# Patient Record
Sex: FEMALE | URBAN - METROPOLITAN AREA
[De-identification: names, ages, dates, MRNs, and addresses within clinical notes are randomized per-mention and may not be internally consistent; named-entity substitution may affect disease eponyms.]

---

## 2019-08-02 ENCOUNTER — APPOINTMENT (RX ONLY)
Dept: URBAN - METROPOLITAN AREA CLINIC 60 | Facility: CLINIC | Age: 29
Setting detail: DERMATOLOGY
End: 2019-08-02

## 2019-08-02 DIAGNOSIS — Z41.9 ENCOUNTER FOR PROCEDURE FOR PURPOSES OTHER THAN REMEDYING HEALTH STATE, UNSPECIFIED: ICD-10-CM

## 2019-08-02 PROCEDURE — ? ADDITIONAL NOTES

## 2019-08-02 PROCEDURE — ? JUVEDERM ULTRA PLUS XC INJECTION

## 2019-08-02 NOTE — PROCEDURE: JUVEDERM ULTRA PLUS XC INJECTION
Decollete Filler  Volume In Cc: 0
Additional Area 2 Location: perioral lines
Topical Anesthesia?: BLT cream (benzocaine 20%, lidocaine 6%, tetracaine 4%)
Procedural Text: The filler was administered to the treatment areas noted above.
Anesthesia Volume In Cc: 0.5
Lot #: [id number]
Additional Area 4 Location: lower verm border
Detail Level: Detailed
Map Statment: See Attach Map for Complete Details
Post-Care Instructions: Patient instructed to apply ice to reduce swelling.
Include Cannula Length?: 1 inch
Include Cannula Brand?: DermaSculpt
Additional Anesthesia Volume In Cc: 0.1
Additional Area 3 Location: upper verm border
Additional Area 1 Location: oral comms
Expiration Date (Month Year): 2020/09/27
Anesthesia Type: 1% lidocaine with epinephrine
Additional Area 5 Location: nose tip
Filler: Juvederm Ultra Plus XC
Include Cannula Information In Note?: Yes
Additional Anesthesia Type: 1% lidocaine with 1:100,000 epinephrine, 1% lidocaine without epinephrine and 8.4% sodium bicarbonate in a 2:2:1 Ratio
Consent: Written consent obtained. Risks include but not limited to bruising, beading, irregular texture, ulceration, infection, allergic reaction, scar formation, incomplete augmentation, temporary nature, procedural pain.
Include Cannula Size?: 27G

## 2019-08-02 NOTE — PROCEDURE: ADDITIONAL NOTES
Additional Notes: Pretreatment oral rinse chlorehexidine and gluconate 0.12%\\nBtmc 30g1”\\n1 juvederm ultra plus syringe \\nUpper verm border/body\\n lower verm border&body
Detail Level: Simple